# Patient Record
Sex: FEMALE | Race: OTHER | URBAN - METROPOLITAN AREA
[De-identification: names, ages, dates, MRNs, and addresses within clinical notes are randomized per-mention and may not be internally consistent; named-entity substitution may affect disease eponyms.]

---

## 2019-07-23 ENCOUNTER — OFFICE VISIT (OUTPATIENT)
Dept: INTERNAL MEDICINE CLINIC | Facility: CLINIC | Age: 54
End: 2019-07-23
Payer: COMMERCIAL

## 2019-07-23 VITALS
DIASTOLIC BLOOD PRESSURE: 96 MMHG | RESPIRATION RATE: 16 BRPM | HEIGHT: 59 IN | BODY MASS INDEX: 25.2 KG/M2 | WEIGHT: 125 LBS | SYSTOLIC BLOOD PRESSURE: 158 MMHG | HEART RATE: 99 BPM

## 2019-07-23 DIAGNOSIS — K76.89 LIVER DYSFUNCTION: ICD-10-CM

## 2019-07-23 DIAGNOSIS — E11.22 CKD STAGE 3 DUE TO TYPE 2 DIABETES MELLITUS (HCC): ICD-10-CM

## 2019-07-23 DIAGNOSIS — Z12.31 VISIT FOR SCREENING MAMMOGRAM: ICD-10-CM

## 2019-07-23 DIAGNOSIS — N18.30 TYPE 2 DIABETES MELLITUS WITH STAGE 3 CHRONIC KIDNEY DISEASE, WITHOUT LONG-TERM CURRENT USE OF INSULIN (HCC): ICD-10-CM

## 2019-07-23 DIAGNOSIS — M1A.39X0 CHRONIC GOUT DUE TO RENAL IMPAIRMENT OF MULTIPLE SITES WITHOUT TOPHUS: Primary | ICD-10-CM

## 2019-07-23 DIAGNOSIS — R63.4 WEIGHT LOSS: ICD-10-CM

## 2019-07-23 DIAGNOSIS — Z78.0 MENOPAUSE: ICD-10-CM

## 2019-07-23 DIAGNOSIS — E11.22 TYPE 2 DIABETES MELLITUS WITH STAGE 3 CHRONIC KIDNEY DISEASE, WITHOUT LONG-TERM CURRENT USE OF INSULIN (HCC): ICD-10-CM

## 2019-07-23 DIAGNOSIS — I10 ESSENTIAL HYPERTENSION: ICD-10-CM

## 2019-07-23 DIAGNOSIS — N18.30 CKD STAGE 3 DUE TO TYPE 2 DIABETES MELLITUS (HCC): ICD-10-CM

## 2019-07-23 PROBLEM — E11.9 DIABETES (HCC): Status: ACTIVE | Noted: 2019-07-23

## 2019-07-23 PROCEDURE — 99204 OFFICE O/P NEW MOD 45 MIN: CPT | Performed by: INTERNAL MEDICINE

## 2019-07-23 RX ORDER — CARVEDILOL 12.5 MG/1
12.5 TABLET ORAL 2 TIMES DAILY WITH MEALS
COMMUNITY
End: 2019-07-23

## 2019-07-23 RX ORDER — MULTIVIT WITH CALCIUM,IRON,MIN
1 TABLET ORAL DAILY
COMMUNITY

## 2019-07-23 RX ORDER — NIFEDIPINE 30 MG/1
30 TABLET, EXTENDED RELEASE ORAL DAILY
COMMUNITY
End: 2019-07-23

## 2019-07-23 RX ORDER — DILTIAZEM HYDROCHLORIDE 180 MG/1
180 CAPSULE, EXTENDED RELEASE ORAL DAILY
Qty: 30 CAPSULE | Refills: 3 | Status: SHIPPED | OUTPATIENT
Start: 2019-07-23 | End: 2019-08-27

## 2019-07-23 RX ORDER — METFORMIN HYDROCHLORIDE 500 MG/1
500 TABLET, EXTENDED RELEASE ORAL 2 TIMES DAILY WITH MEALS
Qty: 60 TABLET | Refills: 3 | Status: SHIPPED | OUTPATIENT
Start: 2019-07-23 | End: 2019-08-27

## 2019-07-23 RX ORDER — CALCIUM CARBONATE/VITAMIN D3 600 MG-10
1 TABLET ORAL 2 TIMES DAILY
COMMUNITY
End: 2019-07-23

## 2019-07-23 RX ORDER — GLIMEPIRIDE 2 MG/1
2 TABLET ORAL
Qty: 30 TABLET | Refills: 3 | Status: SHIPPED | OUTPATIENT
Start: 2019-07-23 | End: 2019-07-25

## 2019-07-23 RX ORDER — CHLORTHALIDONE 25 MG/1
25 TABLET ORAL DAILY
COMMUNITY
End: 2019-08-27

## 2019-07-23 RX ORDER — AMITRIPTYLINE HYDROCHLORIDE 10 MG/1
10 TABLET, FILM COATED ORAL NIGHTLY
COMMUNITY
End: 2019-08-27

## 2019-07-23 RX ORDER — ALLOPURINOL 100 MG/1
100 TABLET ORAL DAILY
COMMUNITY
End: 2019-08-27

## 2019-07-23 RX ORDER — GLIPIZIDE 5 MG/1
5 TABLET ORAL
COMMUNITY
End: 2019-07-23

## 2019-07-23 RX ORDER — METOPROLOL SUCCINATE 50 MG/1
50 TABLET, EXTENDED RELEASE ORAL DAILY
Qty: 30 TABLET | Refills: 3 | Status: SHIPPED | OUTPATIENT
Start: 2019-07-23 | End: 2019-08-27

## 2019-07-23 NOTE — PROGRESS NOTES
HPI:    Patient ID: Charity Arias is a 53year old female. Patient, transfer of care. Seen usually at [facility] per Dr. Alivia Mares. Last office about 3 months back.    Medical history of diabetes, non-insulin-dependent, hyperlipidemia, kidney di history, obesity, sedentary lifestyle, post-menopausal and hypertension. Current diabetic treatment includes oral agent (dual therapy) (Glipizide 5 mg half a tablet daily and metformin 850 mg 1 tablet 3 times daily).  She is compliant with treatment all of Take 10 mg by mouth nightly. Disp:  Rfl:    aspirin 81 MG Oral Tab Take 81 mg by mouth daily. Disp:  Rfl:    allopurinol 100 MG Oral Tab Take 100 mg by mouth daily.  Disp:  Rfl:    metFORMIN HCl  MG Oral Tablet 24 Hr Take 1 tablet (500 mg total) by mo has normal reflexes. No cranial nerve deficit. She exhibits normal muscle tone. Coordination normal.   Skin: No rash noted. No erythema. Psychiatric: She has a normal mood and affect.  Her behavior is normal. Thought content normal.   Nursing note and vit daily. Advised to monitor the blood sugars before breakfast and before dinner for the next 3 to 4 weeks. Follow-up appointment at that time after labs are completed for further adjustment in medications.   Patient is advised to give me a call if she harrison about 3 weeks (around 8/13/2019).     PT UNDERSTANDS AND AGREES TO FOLLOW DIRECTIONS AND ADVICE    Orders Placed This Encounter      Microalb/Creat Ratio, Random Urine      Hemoglobin A1C [E]      CBC With Differential With Platelet      Comp Metabolic Pane

## 2019-07-23 NOTE — ASSESSMENT & PLAN NOTE
History of liver dysfunction, last labs show persistent abnormality. No history of gallstones. Repeat ultrasound of the liver and gallbladder as well as the pancreas has been ordered.   Patient has been having ongoing weight loss which most likely is from

## 2019-07-23 NOTE — ASSESSMENT & PLAN NOTE
Weight loss–unexpected 10 pound weight loss over the past 2 to 3 months most likely related to the pain in the mouth as well as cold sores. Medications have been changed today and will reassess symptoms at her next office visit.   Advised to eat a bedtime

## 2019-07-23 NOTE — ASSESSMENT & PLAN NOTE
Blood sugars seem well controlled–6.5 on her last A1c. She does have hypoglycemic episodes that started recently after some changes in medications. She wakes up multiple times at night with low sugar reactions.   She is currently on glipizide 5 mg half a

## 2019-07-23 NOTE — PATIENT INSTRUCTIONS
Problem List Items Addressed This Visit        Unprioritized    Chronic gout due to renal impairment of multiple sites without tophus - Primary    Relevant Medications    allopurinol 100 MG Oral Tab    Other Relevant Orders    URIC ACID, SERUM    CKD stage chronic kidney disease and gout. No history of intolerance to ACE inhibitors but does have CKD stage III. Given mild sores, uncontrolled hypertension will make changes in medications as follows. Stop nifedipine, Coreg.   Start instead on Tiazac 180 mg 1

## 2019-07-23 NOTE — ASSESSMENT & PLAN NOTE
Blood pressure (!) 158/96, pulse 99, resp. rate 16, height 4' 11\" (1.499 m), weight 125 lb (56.7 kg). Repeat blood pressure check shows persistent elevation with tachycardia.   Patient is currently on Coreg 12.5 mg 1 tablet twice daily, nifedipine 30 mg

## 2019-07-25 ENCOUNTER — NURSE TRIAGE (OUTPATIENT)
Dept: INTERNAL MEDICINE CLINIC | Facility: CLINIC | Age: 54
End: 2019-07-25

## 2019-07-25 RX ORDER — GLIMEPIRIDE 2 MG/1
2 TABLET ORAL 2 TIMES DAILY WITH MEALS
Qty: 180 TABLET | Refills: 1 | Status: SHIPPED | OUTPATIENT
Start: 2019-07-25 | End: 2019-08-27

## 2019-07-25 NOTE — TELEPHONE ENCOUNTER
Verified pt name and . Reviewed doctor's instructions as noted below. Pt agreed with plan of care. States she does have enough Glimepiride 2 mg to increase to take twice a day, would like new prescription sent to [address] [place] [address].

## 2019-07-25 NOTE — TELEPHONE ENCOUNTER
Increase glimepiride to 2 mg 1 tablet 2 times daily with metformin 500 mg 2 times daily. Drink plenty of fluids to keep hydrated. Avoid eating bagels, bread, fruit or drink juices.   Eat more of egg whites, chicken, fish with vegetables and a small gladys

## 2019-07-25 NOTE — TELEPHONE ENCOUNTER
Action Requested: Summary for Provider     []  Critical Lab, Recommendations Needed  [] Need Additional Advice  []   FYI    []   Need Orders  [] Need Medications Sent to Pharmacy  []  Other     SUMMARY: Dr Isabel Walls, patient reports high fasting glucose in AM

## 2019-08-01 LAB
ABSOLUTE BASOPHILS: 20 CELLS/UL (ref 0–200)
ABSOLUTE EOSINOPHILS: 362 CELLS/UL (ref 15–500)
ABSOLUTE LYMPHOCYTES: 2238 CELLS/UL (ref 850–3900)
ABSOLUTE MONOCYTES: 737 CELLS/UL (ref 200–950)
ABSOLUTE NEUTROPHILS: 3343 CELLS/UL (ref 1500–7800)
ALBUMIN/GLOBULIN RATIO: 1.3 (CALC) (ref 1–2.5)
ALBUMIN: 3.7 G/DL (ref 3.6–5.1)
ALKALINE PHOSPHATASE: 73 U/L (ref 33–130)
ALT: 125 U/L (ref 6–29)
AMYLASE: 81 U/L (ref 21–101)
APPEARANCE: CLEAR
AST: 91 U/L (ref 10–35)
BASOPHILS: 0.3 %
BILIRUBIN, TOTAL: 0.3 MG/DL (ref 0.2–1.2)
BILIRUBIN: NEGATIVE
BUN/CREATININE RATIO: 29 (CALC) (ref 6–22)
BUN: 47 MG/DL (ref 7–25)
CALCIUM: 9 MG/DL (ref 8.6–10.4)
CARBON DIOXIDE: 25 MMOL/L (ref 20–32)
CHLORIDE: 109 MMOL/L (ref 98–110)
CHOL/HDLC RATIO: 6.6 (CALC)
CHOLESTEROL, TOTAL: 177 MG/DL
COLOR: YELLOW
CREATININE, RANDOM URINE: 57 MG/DL (ref 20–275)
CREATININE: 1.6 MG/DL (ref 0.5–1.05)
EGFR IF AFRICN AM: 42 ML/MIN/1.73M2
EGFR IF NONAFRICN AM: 36 ML/MIN/1.73M2
EOSINOPHILS: 5.4 %
GLOBULIN: 2.8 G/DL (CALC) (ref 1.9–3.7)
GLUCOSE: 73 MG/DL (ref 65–99)
GLUCOSE: NEGATIVE
HDL CHOLESTEROL: 27 MG/DL
HEMATOCRIT: 33.7 % (ref 35–45)
HEMOGLOBIN A1C: 6.5 % OF TOTAL HGB
HEMOGLOBIN: 10.8 G/DL (ref 11.7–15.5)
KETONES: NEGATIVE
LDL-CHOLESTEROL: 107 MG/DL (CALC)
LIPASE: 46 U/L (ref 7–60)
LYMPHOCYTES: 33.4 %
MCH: 26.4 PG (ref 27–33)
MCHC: 32 G/DL (ref 32–36)
MCV: 82.4 FL (ref 80–100)
MICROALBUMIN/CREATININE RATIO, RANDOM URINE: 889 MCG/MG CREAT
MICROALBUMIN: 50.7 MG/DL
MONOCYTES: 11 %
MPV: 10.8 FL (ref 7.5–12.5)
NEUTROPHILS: 49.9 %
NITRITE: NEGATIVE
NON-HDL CHOLESTEROL: 150 MG/DL (CALC)
OCCULT BLOOD: NEGATIVE
PH: 5.5 (ref 5–8)
PLATELET COUNT: 198 THOUSAND/UL (ref 140–400)
POTASSIUM: 4.5 MMOL/L (ref 3.5–5.3)
PROTEIN, TOTAL: 6.5 G/DL (ref 6.1–8.1)
RDW: 14.5 % (ref 11–15)
RED BLOOD CELL COUNT: 4.09 MILLION/UL (ref 3.8–5.1)
SODIUM: 142 MMOL/L (ref 135–146)
SPECIFIC GRAVITY: 1.01 (ref 1–1.03)
TRIGLYCERIDES: 326 MG/DL
TSH: 2.49 MIU/L
URIC ACID: 6.5 MG/DL (ref 2.5–7)
VITAMIN B12: 405 PG/ML (ref 200–1100)
VITAMIN D, 25-OH, TOTAL: 33 NG/ML (ref 30–100)
WHITE BLOOD CELL COUNT: 6.7 THOUSAND/UL (ref 3.8–10.8)

## 2019-08-23 ENCOUNTER — TELEPHONE (OUTPATIENT)
Dept: INTERNAL MEDICINE CLINIC | Facility: CLINIC | Age: 54
End: 2019-08-23

## 2019-08-23 DIAGNOSIS — R79.89 ABNORMAL LIVER FUNCTION TEST: Primary | ICD-10-CM

## 2019-08-23 LAB
BUN/CREATININE RATIO: 27 (CALC) (ref 6–22)
BUN: 42 MG/DL (ref 7–25)
CALCIUM: 9 MG/DL (ref 8.6–10.4)
CARBON DIOXIDE: 21 MMOL/L (ref 20–32)
CHLORIDE: 107 MMOL/L (ref 98–110)
CREATININE: 1.54 MG/DL (ref 0.5–1.05)
EGFR IF AFRICN AM: 44 ML/MIN/1.73M2
EGFR IF NONAFRICN AM: 38 ML/MIN/1.73M2
GLUCOSE: 237 MG/DL (ref 65–99)
POTASSIUM: 4.6 MMOL/L (ref 3.5–5.3)
SODIUM: 137 MMOL/L (ref 135–146)

## 2019-08-23 NOTE — TELEPHONE ENCOUNTER
Pt states she is at [facility] for fasting  Lab test. No order sent to [facility].  Chart reviewed and noted lab request to [address] [place] [address] Lab.     Order faxed to [facility] per pt request. Victor Manuel Gardner faxed [phone] # [phone]

## 2019-08-27 ENCOUNTER — OFFICE VISIT (OUTPATIENT)
Dept: INTERNAL MEDICINE CLINIC | Facility: CLINIC | Age: 54
End: 2019-08-27
Payer: COMMERCIAL

## 2019-08-27 VITALS
RESPIRATION RATE: 16 BRPM | HEIGHT: 59 IN | DIASTOLIC BLOOD PRESSURE: 98 MMHG | BODY MASS INDEX: 25.53 KG/M2 | HEART RATE: 103 BPM | WEIGHT: 126.63 LBS | SYSTOLIC BLOOD PRESSURE: 155 MMHG

## 2019-08-27 DIAGNOSIS — I10 ESSENTIAL HYPERTENSION: ICD-10-CM

## 2019-08-27 DIAGNOSIS — K76.89 LIVER DYSFUNCTION: ICD-10-CM

## 2019-08-27 DIAGNOSIS — E11.22 CKD STAGE 3 DUE TO TYPE 2 DIABETES MELLITUS (HCC): Primary | ICD-10-CM

## 2019-08-27 DIAGNOSIS — E11.22 TYPE 2 DIABETES MELLITUS WITH STAGE 3 CHRONIC KIDNEY DISEASE, WITHOUT LONG-TERM CURRENT USE OF INSULIN (HCC): ICD-10-CM

## 2019-08-27 DIAGNOSIS — N18.30 CKD STAGE 3 DUE TO TYPE 2 DIABETES MELLITUS (HCC): Primary | ICD-10-CM

## 2019-08-27 DIAGNOSIS — B00.2 ORAL HERPES SIMPLEX INFECTION: ICD-10-CM

## 2019-08-27 DIAGNOSIS — N18.30 TYPE 2 DIABETES MELLITUS WITH STAGE 3 CHRONIC KIDNEY DISEASE, WITHOUT LONG-TERM CURRENT USE OF INSULIN (HCC): ICD-10-CM

## 2019-08-27 PROCEDURE — 99214 OFFICE O/P EST MOD 30 MIN: CPT | Performed by: INTERNAL MEDICINE

## 2019-08-27 RX ORDER — GLIMEPIRIDE 2 MG/1
2 TABLET ORAL 2 TIMES DAILY WITH MEALS
Qty: 180 TABLET | Refills: 2 | Status: SHIPPED | OUTPATIENT
Start: 2019-08-27

## 2019-08-27 RX ORDER — DILTIAZEM HYDROCHLORIDE 180 MG/1
180 CAPSULE, EXTENDED RELEASE ORAL DAILY
Qty: 90 CAPSULE | Refills: 2 | Status: SHIPPED | OUTPATIENT
Start: 2019-08-27 | End: 2019-08-27

## 2019-08-27 RX ORDER — CHLORTHALIDONE 25 MG/1
TABLET ORAL
Qty: 45 TABLET | Refills: 2 | Status: SHIPPED | OUTPATIENT
Start: 2019-08-27 | End: 2019-08-27

## 2019-08-27 RX ORDER — DILTIAZEM HYDROCHLORIDE 360 MG/1
360 CAPSULE, EXTENDED RELEASE ORAL DAILY
Qty: 90 CAPSULE | Refills: 2 | Status: SHIPPED | OUTPATIENT
Start: 2019-08-27

## 2019-08-27 RX ORDER — AMITRIPTYLINE HYDROCHLORIDE 10 MG/1
10 TABLET, FILM COATED ORAL NIGHTLY
Qty: 90 TABLET | Refills: 2 | Status: SHIPPED | OUTPATIENT
Start: 2019-08-27

## 2019-08-27 RX ORDER — METOPROLOL SUCCINATE 50 MG/1
50 TABLET, EXTENDED RELEASE ORAL DAILY
Qty: 90 TABLET | Refills: 2 | Status: SHIPPED | OUTPATIENT
Start: 2019-08-27 | End: 2020-08-21

## 2019-08-27 RX ORDER — METFORMIN HYDROCHLORIDE 500 MG/1
500 TABLET, EXTENDED RELEASE ORAL 2 TIMES DAILY WITH MEALS
Qty: 180 TABLET | Refills: 2 | Status: SHIPPED | OUTPATIENT
Start: 2019-08-27

## 2019-08-27 RX ORDER — ALLOPURINOL 100 MG/1
100 TABLET ORAL DAILY
Qty: 90 TABLET | Refills: 2 | Status: SHIPPED | OUTPATIENT
Start: 2019-08-27

## 2019-08-27 RX ORDER — VALACYCLOVIR HYDROCHLORIDE 1 G/1
TABLET, FILM COATED ORAL
Qty: 20 TABLET | Refills: 0 | Status: SHIPPED | OUTPATIENT
Start: 2019-08-27

## 2019-08-27 RX ORDER — CHLORTHALIDONE 25 MG/1
TABLET ORAL
Qty: 45 TABLET | Refills: 2 | Status: SHIPPED | OUTPATIENT
Start: 2019-08-27

## 2019-08-27 RX ORDER — VALACYCLOVIR HYDROCHLORIDE 1 G/1
TABLET, FILM COATED ORAL
Qty: 20 TABLET | Refills: 0 | Status: SHIPPED | OUTPATIENT
Start: 2019-08-27 | End: 2019-08-27

## 2019-08-27 RX ORDER — AMITRIPTYLINE HYDROCHLORIDE 10 MG/1
10 TABLET, FILM COATED ORAL NIGHTLY
Qty: 90 TABLET | Refills: 2 | Status: SHIPPED | OUTPATIENT
Start: 2019-08-27 | End: 2019-08-27

## 2019-08-27 NOTE — PATIENT INSTRUCTIONS
Problem List Items Addressed This Visit        Unprioritized    CKD stage 3 due to type 2 diabetes mellitus ([facility] [address] - Primary     Chronic kidney disease stage III. Recheck labs prior to the next office visit.   Drink plenty of fluids to keep         Relevant Metoprolol Succinate ER (TOPROL XL) 50 MG Oral Tablet 24 Hr    chlorthalidone 25 MG Oral Tab    dilTIAZem HCl ER Beads 360 MG Oral Capsule SR 24 Hr    Liver dysfunction     History of chronic liver dysfunction. Remains abnormal at this time.   No histor

## 2019-08-27 NOTE — ASSESSMENT & PLAN NOTE
Chronic kidney disease stage III. Recheck labs prior to the next office visit.   Drink plenty of fluids to keep

## 2019-08-27 NOTE — ASSESSMENT & PLAN NOTE
Hemoglobin A1c was at 6.5 checked on July 31. She has had some swelling sugars with occasional high sugars. Diet changes discussed–avoid eating large amounts of rice, starches. Combine vegetables when eating starches.   Eat a lean protein like chicken or

## 2019-08-27 NOTE — ASSESSMENT & PLAN NOTE
Blood pressure (!) 155/98, pulse 103, resp. rate 16, height 4' 11\" (1.499 m), weight 126 lb 9.6 oz (57.4 kg). Patient was started on Tiazac 1 capsule daily, Toprol-XL 50 mg once daily and did not time at her last office visit.   She is currently on chlort

## 2019-08-27 NOTE — ASSESSMENT & PLAN NOTE
History of chronic liver dysfunction. Remains abnormal at this time. No history of stones. Advised to complete the ultrasound of the gallbladder and pancreas as ordered at [facility] as her insurance does not cover test ordered here.

## 2019-08-27 NOTE — PROGRESS NOTES
HPI:    Patient ID: Jeff Zhu is a 53year old female. Notes recorded by Doyle Jacques MD on 8/4/2019 at 7:51 AM CDT  The diabetes test-hemoglobin A1c looks good at 6.5.   The kidney functions are quite low and need to be improved soon-please se counting. Her home blood glucose trend is decreasing steadily. Her breakfast blood glucose is taken between 7-8 am. Her breakfast blood glucose range is generally 110-130 mg/dl.  Her dinner blood glucose is taken between 6-7 pm. Her dinner blood glucose ran problem. The current episode started more than 1 year ago. The problem occurs constantly. The problem has been waxing and waning (last hb ar cch at 6. 1.no brbpr or vishnu). Pertinent negatives include no chest pain or headaches.  Associated symptoms commen tablet Rfl: 2   valACYclovir HCl (VALTREX) 1 G Oral Tab 1 tab by mouth twice a day Disp: 20 tablet Rfl: 0   glimepiride 2 MG Oral Tab Take 1 tablet (2 mg total) by mouth 2 (two) times daily with meals.  Disp: 180 tablet Rfl: 2   chlorthalidone 25 MG Oral Ta exhibits normal muscle tone. Coordination normal.   Skin: No rash noted. No erythema. Psychiatric: She has a normal mood and affect. Her behavior is normal. Thought content normal.   Nursing note and vitals reviewed.              ASSESSMENT/PLAN:     Prob metFORMIN HCl  MG Oral Tablet 24 Hr    glimepiride 2 MG Oral Tab    Liver dysfunction     History of chronic liver dysfunction. Remains abnormal at this time. No history of stones.   Advised to complete the ultrasound of the gallbladder and pancr nightly. • dilTIAZem HCl ER Beads 360 MG Oral Capsule SR 24 Hr 90 capsule 2     Sig: Take 1 capsule (360 mg total) by mouth daily.        Imaging & Referrals:  US ABDOMEN COMPLETE (CPT=[id number])       [facility]#[id number]

## 2019-08-29 ENCOUNTER — TELEPHONE (OUTPATIENT)
Dept: INTERNAL MEDICINE CLINIC | Facility: CLINIC | Age: 54
End: 2019-08-29

## 2019-08-29 NOTE — TELEPHONE ENCOUNTER
Pt states that she saw Dr. Grover Corcoran on 8-27-19 and they were going to send all her scripts to [facility]. Fax: [phone]. Per pt she provided this information at the time of the visit. Pt would like to know if they can be sent today.

## 2019-08-29 NOTE — TELEPHONE ENCOUNTER
I faxed them to [facility] with confirmation and I will let pt. Know, I left message. I also cancelled the orders through Jose.

## 2019-08-29 NOTE — TELEPHONE ENCOUNTER
I'm working on it. I called house keeping and they are coming up and we are going to get the scripts out and refax to [facility] #[phone]. Pt. Was informed.

## 2019-09-06 LAB
BUN/CREATININE RATIO: 27 (CALC) (ref 6–22)
BUN: 45 MG/DL (ref 7–25)
CALCIUM: 9.3 MG/DL (ref 8.6–10.4)
CARBON DIOXIDE: 22 MMOL/L (ref 20–32)
CHLORIDE: 105 MMOL/L (ref 98–110)
CREATININE: 1.67 MG/DL (ref 0.5–1.05)
EGFR IF AFRICN AM: 40 ML/MIN/1.73M2
EGFR IF NONAFRICN AM: 34 ML/MIN/1.73M2
GLUCOSE: 97 MG/DL (ref 65–99)
POTASSIUM: 4.5 MMOL/L (ref 3.5–5.3)
SODIUM: 137 MMOL/L (ref 135–146)

## 2019-09-10 ENCOUNTER — TELEPHONE (OUTPATIENT)
Dept: OTHER | Age: 54
End: 2019-09-10

## 2019-09-10 NOTE — TELEPHONE ENCOUNTER
Patient is requesting that her last blood work results get faxed to her new nephrologist from [facility] at [phone] Attention: Nahomy Quesada (new patient)  Clinical staff, please assist, thanks    Please respond to pool: [facility]

## 2019-09-12 NOTE — TELEPHONE ENCOUNTER
The fax failed 3 x so I called for another fax number which was [phone] and I got a confirmation.

## 2019-09-13 NOTE — TELEPHONE ENCOUNTER
Spoke with patient ( verified) and reports Dr. Charisse Myers [facility] nephrologist) has not yet received information--requesting \"all recent reports from Dr. Tres Barr" to be sent to her office today at fax #[phone].     Patient requesting return ca

## 2019-11-12 ENCOUNTER — TELEPHONE (OUTPATIENT)
Dept: INTERNAL MEDICINE CLINIC | Facility: CLINIC | Age: 54
End: 2019-11-12

## 2019-11-12 NOTE — TELEPHONE ENCOUNTER
Per Efe Gilmore they have a question on the patient's metoprolol medication. Per Efe Gilmore the patient is requesting a 90 day supply of the medication to be sent to them the mail order pharmacy not the local pharmacy.  Efe Gilmore would like to know if a 90 day supply can

## 2019-11-12 NOTE — TELEPHONE ENCOUNTER
Spoke with pt who states she does not need refill sent to the requesting mail order below. Pt states she will call the mail order to inform them.

## 2019-12-09 RX ORDER — METOPROLOL SUCCINATE 50 MG/1
TABLET, EXTENDED RELEASE ORAL
Qty: 30 TABLET | Refills: 3 | Status: SHIPPED | OUTPATIENT
Start: 2019-12-09

## 2020-06-29 ENCOUNTER — TELEPHONE (OUTPATIENT)
Dept: INTERNAL MEDICINE CLINIC | Facility: CLINIC | Age: 55
End: 2020-06-29

## 2020-07-02 ENCOUNTER — MED REC SCAN ONLY (OUTPATIENT)
Dept: INTERNAL MEDICINE CLINIC | Facility: CLINIC | Age: 55
End: 2020-07-02

## 2020-08-06 ENCOUNTER — TELEPHONE (OUTPATIENT)
Dept: INTERNAL MEDICINE CLINIC | Facility: CLINIC | Age: 55
End: 2020-08-06

## 2020-08-06 DIAGNOSIS — N18.30 TYPE 2 DIABETES MELLITUS WITH STAGE 3 CHRONIC KIDNEY DISEASE, WITHOUT LONG-TERM CURRENT USE OF INSULIN (HCC): Primary | ICD-10-CM

## 2020-08-06 DIAGNOSIS — E11.22 TYPE 2 DIABETES MELLITUS WITH STAGE 3 CHRONIC KIDNEY DISEASE, WITHOUT LONG-TERM CURRENT USE OF INSULIN (HCC): Primary | ICD-10-CM

## 2020-08-06 NOTE — TELEPHONE ENCOUNTER
A1C is overdue. Last A1C - 07/31/2019 - 6.5%  LOV - 08/27/19    MAURICIO - please advise if ok to order repeat A1C (pend for approval).

## 2020-09-27 RX ORDER — METFORMIN HYDROCHLORIDE 500 MG/1
TABLET, EXTENDED RELEASE ORAL
Qty: 180 TABLET | Refills: 0 | OUTPATIENT
Start: 2020-09-27

## 2020-09-27 RX ORDER — ASPIRIN 81 MG/1
TABLET, DELAYED RELEASE ORAL
Qty: 90 TABLET | Refills: 0 | OUTPATIENT
Start: 2020-09-27

## 2020-09-27 RX ORDER — ALLOPURINOL 100 MG/1
TABLET ORAL
Qty: 90 TABLET | Refills: 0 | OUTPATIENT
Start: 2020-09-27

## 2020-09-27 RX ORDER — AMITRIPTYLINE HYDROCHLORIDE 10 MG/1
TABLET, FILM COATED ORAL
Qty: 90 TABLET | Refills: 0 | OUTPATIENT
Start: 2020-09-27

## 2020-09-27 RX ORDER — CHLORTHALIDONE 25 MG/1
TABLET ORAL
Qty: 45 TABLET | Refills: 0 | OUTPATIENT
Start: 2020-09-27

## (undated) NOTE — LETTER
01/22/20        [address]      Dear Jose L Balbuena records indicate that you have outstanding lab work and or testing that was ordered for you and has not yet been completed:  Orders Placed This [place]

## (undated) NOTE — LETTER
03/26/20        [address]      Dear Andreea Walters records indicate that you have outstanding lab work and or testing that was ordered for you and has not yet been completed:  Orders Placed This [place]

## (undated) NOTE — LETTER
02/26/20        [address]      Dear Marybel Calzada records indicate that you have outstanding lab work and or testing that was ordered for you and has not yet been completed:  Orders Placed This [place]